# Patient Record
Sex: FEMALE | URBAN - METROPOLITAN AREA
[De-identification: names, ages, dates, MRNs, and addresses within clinical notes are randomized per-mention and may not be internally consistent; named-entity substitution may affect disease eponyms.]

---

## 2023-01-05 ENCOUNTER — APPOINTMENT (RX ONLY)
Dept: URBAN - METROPOLITAN AREA CLINIC 55 | Facility: CLINIC | Age: 63
Setting detail: DERMATOLOGY
End: 2023-01-05

## 2023-01-05 DIAGNOSIS — Z41.9 ENCOUNTER FOR PROCEDURE FOR PURPOSES OTHER THAN REMEDYING HEALTH STATE, UNSPECIFIED: ICD-10-CM

## 2023-01-05 PROCEDURE — ? INVENTORY

## 2023-01-05 PROCEDURE — ? COSMETIC CONSULTATION: GENERAL

## 2023-01-05 PROCEDURE — ? COSMETIC CONSULTATION: FILLERS

## 2023-01-05 PROCEDURE — ? FILLERS

## 2023-01-05 PROCEDURE — ? DYSPORT

## 2023-01-05 NOTE — PROCEDURE: FILLERS
Nasolabial Folds Filler Volume In Cc: 0
Anesthesia Type: 1% lidocaine with epinephrine
Include Cannula Information In Note?: No
Anesthesia Volume In Cc: 0.5
Include Cannula Information In Note?: Yes
Detail Level: Detailed
Include Cannula Size?: 25G
Include Cannula Length?: 1.5 inch
Consent was obtained.
Post-Care Instructions: After care instructions were provided verbally and in writing.
Filler: RHA 3
Map Statment: See Attach Map for Complete Details
Filler: RHA 4

## 2023-01-05 NOTE — PROCEDURE: DYSPORT
Show Additional Area 3: Yes
R Brow Units: 0
Detail Level: Detailed
Glabellar Complex Units: 50
Show Ucl Units: No
Show Inventory Tab: Show
Consent was obtained.
Post-Care Instructions: Patient instructed to not lie down for 4 hours and limit physical activity for 24 hours.
Dilution (U/0.1 Cc): 10

## 2023-01-05 NOTE — PROCEDURE: COSMETIC CONSULTATION: FILLERS
Airway    Date/Time: 2/23/2022 8:02 AM  Performed by: Damien Amezcua M.D.  Authorized by: Damien Amezcua M.D.     Location:  OR  Urgency:  Elective  Indications for Airway Management:  Anesthesia      Spontaneous Ventilation: absent    Sedation Level:  Deep  Preoxygenated: Yes    Patient Position:  Sniffing  Mask Difficulty Assessment:  1 - vent by mask  Final Airway Type:  Endotracheal airway  Final Endotracheal Airway:  ETT  Cuffed: Yes    Technique Used for Successful ETT Placement:  Direct laryngoscopy  Devices/Methods Used in Placement:  Intubating stylet    Insertion Site:  Oral  Blade Type:  Schultz  Laryngoscope Blade/Videolaryngoscope Blade Size:  2  ETT Size (mm):  6.5  Measured from:  Gums  ETT to Gums (cm):  21  Placement Verified by: auscultation and capnometry    Cormack-Lehane Classification:  Grade IIa - partial view of glottis  Number of Attempts at Approach:  1          
Peripheral Block    Date/Time: 2/23/2022 7:21 AM  Performed by: Damien Amezcua M.D.  Authorized by: Damien Amezcua M.D.     Patient Location:  Pre-op  Start Time:  2/23/2022 7:21 AM  End Time:  2/23/2022 7:26 AM  Reason for Block: at surgeon's request and post-op pain management ONLY    patient identified, IV checked, site marked, risks and benefits discussed, surgical consent, monitors and equipment checked, pre-op evaluation and timeout performed    Patient Position:  Supine  Prep: ChloraPrep    Monitoring:  Heart rate, continuous pulse ox and cardiac monitor  Block Region:  Upper Extremity  Upper Extremity - Block Type:  BRACHIAL PLEXUS block, Interscalene approach    Laterality:  Right  Procedures: ultrasound guided  Image captured, interpreted and electronically stored.  Local Infiltration:  Lidocaine  Strength:  1 %  Dose:  1 ml  Block Type:  Single-shot  Needle Length:  50mm  Needle Gauge:  22 G  Needle Localization:  Ultrasound guidance  Injection Assessment:  Negative aspiration for heme, no paresthesia on injection, incremental injection and local visualized surrounding nerve on ultrasound  Evidence of intravascular injection: No     US Guided Interscalene Brachial Plexus Block   US transducer placed on the neck in oblique plane approximately at the level of C6.  Anterior and Middle Scalene (MSM) muscles identified with nerve trunks identified between the muscles.  Needle inserted lateral to probe and advanced under direct visualization through the MSM into a perineural position.  After negative aspiration, LA injected with ease and visualized surrounding the nerve trunks.  Printed image on chart.        
Detail Level: Detailed

## 2023-09-05 ENCOUNTER — APPOINTMENT (RX ONLY)
Dept: URBAN - METROPOLITAN AREA CLINIC 55 | Facility: CLINIC | Age: 63
Setting detail: DERMATOLOGY
End: 2023-09-05

## 2023-09-05 DIAGNOSIS — Z41.9 ENCOUNTER FOR PROCEDURE FOR PURPOSES OTHER THAN REMEDYING HEALTH STATE, UNSPECIFIED: ICD-10-CM

## 2023-09-05 PROCEDURE — ? FILLERS

## 2023-09-05 PROCEDURE — ? INVENTORY

## 2023-09-05 PROCEDURE — ? DYSPORT

## 2023-09-05 NOTE — PROCEDURE: FILLERS
[place] Filler Volume In Cc: 0
Include Cannula Information In Note?: Yes
Include Cannula Information In Note?: No
Consent was obtained.
Post-Care Instructions: After care instructions were provided verbally and in writing.
Include Cannula Size?: 27G
Anesthesia Type: 1% lidocaine with epinephrine
Anesthesia Volume In Cc: 0.5
Detail Level: Detailed
Map Statment: See Attach Map for Complete Details
Filler: RHA 3

## 2023-09-05 NOTE — PROCEDURE: DYSPORT
Post-Care Instructions: Patient instructed to not lie down for 4 hours and limit physical activity for 24 hours.
L Brow Units: 0
Show Lateral Platysmal Band Units: Yes
Consent was obtained.
Show Ucl Units: No
Dilution (U/0.1 Cc): 10
Detail Level: Detailed
Show Inventory Tab: Show
Glabellar Complex Units: 50

## 2023-09-11 ENCOUNTER — APPOINTMENT (RX ONLY)
Dept: URBAN - METROPOLITAN AREA CLINIC 55 | Facility: CLINIC | Age: 63
Setting detail: DERMATOLOGY
End: 2023-09-11

## 2023-09-11 DIAGNOSIS — Z41.9 ENCOUNTER FOR PROCEDURE FOR PURPOSES OTHER THAN REMEDYING HEALTH STATE, UNSPECIFIED: ICD-10-CM

## 2023-09-11 PROCEDURE — ? DYSPORT

## 2023-09-11 NOTE — PROCEDURE: DYSPORT
Show Additional Area 4: Yes
Show Right And Left Pupillary Line Units: No
Additional Area 2 Units: 0
Detail Level: Detailed
Consent obtained and risk reviewed.
Show Inventory Tab: Show
Post-Care Instructions: Patient instructed to not lie down flat for 4 hours or rub the treatment area.
Dilution (U/0.1 Cc): 10
Additional Area 1 Location: upper cutaneous
Additional Area 2 Location: [facility]
Additional Area 1 Units: 5

## 2024-06-26 ENCOUNTER — APPOINTMENT (RX ONLY)
Dept: URBAN - METROPOLITAN AREA CLINIC 55 | Facility: CLINIC | Age: 64
Setting detail: DERMATOLOGY
End: 2024-06-26

## 2024-06-26 DIAGNOSIS — Z41.9 ENCOUNTER FOR PROCEDURE FOR PURPOSES OTHER THAN REMEDYING HEALTH STATE, UNSPECIFIED: ICD-10-CM

## 2024-06-26 PROCEDURE — ? DYSPORT

## 2024-06-26 NOTE — PROCEDURE: DYSPORT
Show Right And Left Brow Units: No
Show Additional Area 5: Yes
Lateral Platysmal Bands Units: 0
Show Inventory Tab: Show
Additional Area 1 Location: upper cutaneous
Consent obtained and risk reviewed.
Additional Area 2 Location: chin
Post-Care Instructions: Patient instructed to not lie down flat for 4 hours or rub the treatment area.
Dilution (U/0.1 Cc): 10
Detail Level: Detailed
Glabellar Complex Units: 50

## 2025-01-06 ENCOUNTER — APPOINTMENT (OUTPATIENT)
Dept: URBAN - METROPOLITAN AREA CLINIC 55 | Facility: CLINIC | Age: 65
Setting detail: DERMATOLOGY
End: 2025-01-06

## 2025-01-06 DIAGNOSIS — Z41.9 ENCOUNTER FOR PROCEDURE FOR PURPOSES OTHER THAN REMEDYING HEALTH STATE, UNSPECIFIED: ICD-10-CM

## 2025-01-06 PROCEDURE — ? DYSPORT

## 2025-01-06 NOTE — PROCEDURE: DYSPORT
Show Depressor Anguli Units: Yes
Consent obtained and risk reviewed.
Show Inventory Tab: Show
L Brow Units: 0
Post-Care Instructions: Patient instructed to not lie down flat for 4 hours or rub the treatment area.
Show Ucl Units: No
Dilution (U/0.1 Cc): 10
Glabellar Complex Units: 50
Additional Area 1 Location: upper lip cutaneous
Additional Area 2 Location: chin
Detail Level: Detailed